# Patient Record
Sex: MALE | Race: WHITE
[De-identification: names, ages, dates, MRNs, and addresses within clinical notes are randomized per-mention and may not be internally consistent; named-entity substitution may affect disease eponyms.]

---

## 2019-12-27 ENCOUNTER — HOSPITAL ENCOUNTER (OUTPATIENT)
Dept: HOSPITAL 47 - RADECHMAIN | Age: 29
Discharge: HOME | End: 2019-12-27
Attending: FAMILY MEDICINE
Payer: COMMERCIAL

## 2019-12-27 DIAGNOSIS — R00.2: Primary | ICD-10-CM

## 2019-12-27 PROCEDURE — 93226 XTRNL ECG REC<48 HR SCAN A/R: CPT

## 2019-12-27 PROCEDURE — 93225 XTRNL ECG REC<48 HRS REC: CPT

## 2019-12-31 NOTE — HM
HOLTER MONITOR REPORT



Patient was monitored for 24 hours.



The baseline rhythm is a sinus mechanism with normal conduction, the average rate 80

beats per minute minimum was 50, maximum 142 beats per minute.  Ventricular ectopic

activity was not present.  Supraventricular ectopic activity was present in the form of

rare single PACs.  Episode of sinus arrhythmia was noted.  No diary was available.



CONCLUSION:

1. Sinus mechanism, baseline rhythm.

2. Rare supraventricular ectopic activity.

3. No ventricular ectopic activity.

4. No diary was available.





MMODL / IJN: [id number] / Job#: [id number]

## 2020-01-02 ENCOUNTER — HOSPITAL ENCOUNTER (OUTPATIENT)
Dept: HOSPITAL 47 - RADNMMAIN | Age: 30
Discharge: HOME | End: 2020-01-02
Attending: FAMILY MEDICINE
Payer: COMMERCIAL

## 2020-01-02 DIAGNOSIS — R07.89: Primary | ICD-10-CM

## 2020-01-02 PROCEDURE — 93351 STRESS TTE COMPLETE: CPT

## 2020-01-02 NOTE — ECHOS
STRESS ECHOCARDIOGRAM



DATE OF SERVICE:

01/02/2020



INDICATIONS:

Chest pain.



MEDICATIONS:

Omeprazole.



BASELINE HEART RATE:

83



BASELINE BLOOD PRESSURE:

146/83



MAXIMUM HEART RATE:

179



MAXIMUM BLOOD PRESSURE:

190/92



85% MPHR:

162



100% MPHR:

191



METS:

7.1



MAXIMUM STAGE REACHED:

II



TOTAL EXERCISE TIME:

6 minutes 1 second



CLINICAL INFORMATION:

Baseline rhythm is a sinus mechanism, rate of 83, normal axis and intervals.  Normal

electrocardiogram.  Baseline blood pressure 146/83 mmHg. Patient exercised on [place]

protocol for 6 minutes 1 second achieved a peak rate 179 beats per minute which is

equal to 94% maximum predicted heart rate.  Peak blood pressure 190/92 mmHg. Test was

terminated secondary to fatigue. There was no chest pain. Electrocardiograph monitoring

revealed no evidence of diagnostic ischemic ST deviation.



Baseline echocardiogram revealed normal wall thickening and motion. At peak exercise

there was normal wall motion augmentation with no hypokinesis or dyskinesis.



CONCLUSION:

1. Decreased exercise tolerance with normal electrocardiograph response to exercise.

2. Normal stress echocardiogram with no evidence of stress induced ischemia.





MMODL / IJN: [id number] / Job#: [id number]

## 2025-04-23 ENCOUNTER — HOSPITAL ENCOUNTER (OUTPATIENT)
Dept: HOSPITAL 47 - RADXRMAIN | Age: 35
Discharge: HOME | End: 2025-04-23
Payer: COMMERCIAL

## 2025-04-23 DIAGNOSIS — M47.817: ICD-10-CM

## 2025-04-23 DIAGNOSIS — M51.370: Primary | ICD-10-CM

## 2025-04-23 PROCEDURE — 72110 X-RAY EXAM L-2 SPINE 4/>VWS: CPT
